# Patient Record
Sex: FEMALE | Race: WHITE | NOT HISPANIC OR LATINO | URBAN - METROPOLITAN AREA
[De-identification: names, ages, dates, MRNs, and addresses within clinical notes are randomized per-mention and may not be internally consistent; named-entity substitution may affect disease eponyms.]

---
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
## 2018-01-10 NOTE — PROGRESS NOTES
Assessment    1  Encounter for preventive health examination (V70 0) (Z00 00)   2  Bilateral impacted cerumen (380 4) (H61 23)    Plan  Bilateral impacted cerumen    · Removal Impacted Cerumen Requiring Instrumentation one or both ears - POC;  Status:Complete;   Done: 56RZP4830    Discussion/Summary  healthy adult female Currently, she eats a healthy diet and has an adequate exercise regimen  the risks and benefits of cervical cancer screening were discussed cervical cancer screening is not indicated Breast cancer screening: the risks and benefits of breast cancer screening were discussed, self breast exam technique was taught and monthly self breast exam was advised  Colorectal cancer screening: the risks and benefits of colorectal cancer screening were discussed and colorectal cancer screening is not indicated  Osteoporosis screening: the risks and benefits of osteoporosis screening were discussed and bone mineral density testing is not indicated  The risks and benefits of immunizations were discussed and immunizations are up to date  She was advised to be evaluated by an optometrist and a dentist  Advice and education were given regarding nutrition, aerobic exercise, weight bearing exercise, weight loss, calcium supplements, vitamin D supplements, reproductive health and self skin examination  Patient discussion: discussed with the patient  The patient was counseled regarding diagnostic results, instructions for management, risk factor reductions, prognosis, patient and family education, impressions, risks and benefits of treatment options, importance of compliance with treatment  Possible side effects of new medications were reviewed with the patient/guardian today  The treatment plan was reviewed with the patient/guardian   The patient/guardian understands and agrees with the treatment plan     Self Referrals: No      Chief Complaint  Patient is here for a physical exam for 's licence   Patient has paper with her      History of Present Illness  HM, Adult Female: The patient is being seen for a health maintenance evaluation  General Health: The patient's health since the last visit is described as good  She has regular dental visits  She denies vision problems  She denies hearing loss  Immunizations status: up to date  Lifestyle:  She consumes a diverse and healthy diet  She does not have any weight concerns  She exercises regularly  She does not use tobacco  She denies alcohol use  She denies drug use  Reproductive health: the patient is premenopausal   she reports normal menses  she uses contraception  For contraception, she uses oral contraception pills  she is sexually active  she denies prior pregnancies  Screening: cancer screening reviewed and current  Cervical cancer screening includes no previous pap smear  Breast cancer screening includes no previous mammogram  Colorectal cancer screening includes no previous colonoscopy  metabolic screening reviewed and current  Metabolic screening includes no previous lipid profile, no previous glucose screening, no previous thyroid function test and no previous DEXA  Cardiovascular risk factors: no hypertension and no diabetes  Safety elements used: seat belt, safe driving habits, sunscreen, smoke detector, carbon monoxide detector and hot water temperature less than 120 degrees F  Risk assessments performed include chemical abuse and depression symptoms  Risk findings: no passive smoke exposure, no unsafe sexual behavior, no chemical abuse, no domestic violence, no guns at home, no anxiety symptoms, no depression symptoms and no stress management concerns  HPI: here for a drivers permit and  - no new issues  Student at [address]  parents are in [address]    Constitutional: No fever, no chills, feels well, no tiredness, no recent weight gain or weight loss     Eyes: No complaints of eye pain, no red eyes, no eyesight problems, no discharge, no dry eyes, no itching of eyes  ENT: no complaints of earache, no loss of hearing, no nose bleeds, no nasal discharge, no sore throat, no hoarseness  Cardiovascular: No complaints of slow heart rate, no fast heart rate, no chest pain, no palpitations, no leg claudication, no lower extremity edema  Respiratory: No complaints of shortness of breath, no wheezing, no cough, no SOB on exertion, no orthopnea, no PND  Gastrointestinal: No complaints of abdominal pain, no constipation, no nausea or vomiting, no diarrhea, no bloody stools  Genitourinary: No complaints of dysuria, no incontinence, no pelvic pain, no dysmenorrhea, no vaginal discharge or bleeding  Musculoskeletal: No complaints of arthralgias, no myalgias, no joint swelling or stiffness, no limb pain or swelling  Integumentary: No complaints of skin rash or lesions, no itching, no skin wounds, no breast pain or lump  Neurological: No complaints of headache, no confusion, no convulsions, no numbness, no dizziness or fainting, no tingling, no limb weakness, no difficulty walking  Psychiatric: Not suicidal, no sleep disturbance, no anxiety or depression, no change in personality, no emotional problems  Endocrine: No complaints of proptosis, no hot flashes, no muscle weakness, no deepening of the voice, no feelings of weakness  Hematologic/Lymphatic: No complaints of swollen glands, no swollen glands in the neck, does not bleed easily, does not bruise easily  Active Problems    1  Family planning counseling (V25 09) (Z30 09)   2   Influenza vaccine needed (V04 81) (Z23)    Past Medical History    · History of Acne, unspecified acne type (706 1) (L70 9)   · History of Irritant contact dermatitis, unspecified trigger (692 9) (L24 9)   · History of Known health problems: none (V49 89) (Z78 9)    Surgical History    · History of Prior Surgical Procedure Not Done    Family History  Mother    · Family history of hypothyroidism (V18 19) (Z83 49)   · Family history of osteoporosis (V17 81) (Z82 62)  Grandparent    · Family history of cardiac disorder (V17 49) (Z82 49)   · Family history of malignant neoplasm (V16 9) (Z80 9)  Maternal Grandmother    · Family history of hypertension (V17 49) (Z82 49)    Social History    · Always uses seat belt   · Does not drink alcohol (V49 89) (Z78 9)   · Full time student   · [employer]   · Never a smoker   · No caffeine use   · [employer]   · Single    Current Meds   1  Benzoyl Peroxide 2 5 % External Gel; Therapy: (Recorded:05Wqy0721) to Recorded   2  Cetaphil RestoraDerm External Lotion; USE ON hands 2-3 TIMES A DAY; Therapy: 94Qcu1601 to (Evaluate:99Buz7633)  Requested for: 35Ghf5595; Last   Rx:75Vjj0488 Ordered   3  Lo Loestrin Fe 1 MG-10 MCG / 10 MCG Oral Tablet; TAKE 1 TABLET DAILY AS   DIRECTED; Therapy: 43QBO1904 to (Evaluate:43Zac9499)  Requested for: 49NRY3586; Last   Rx:41Dar7510 Ordered    Allergies    1  No Known Drug Allergies    Vitals   Recorded: 52VFK9959 10:33AM   Temperature 97 4 F   Heart Rate 77   Respiration 18   Systolic 213   Diastolic 62   Height 5 ft 7 32 in   Weight 135 lb    BMI Calculated 20 94   BSA Calculated 1 72   BMI Percentile 41 %   2-20 Stature Percentile 88 %   2-20 Weight Percentile 61 %   O2 Saturation 98   Pain Scale 0     Physical Exam    Constitutional   General appearance: No acute distress, well appearing and well nourished  Eyes   Conjunctiva and lids: No swelling, erythema or discharge  Pupils and irises: Equal, round, reactive to light  Ears, Nose, Mouth, and Throat   External inspection of ears and nose: Normal     Otoscopic examination: Tympanic membranes translucent with normal light reflex  Canals patent without erythema  The right external canal had a cerumen impaction  The left external canal had a cerumen impaction  Hearing: Normal     Nasal mucosa, septum, and turbinates: Normal without edema or erythema  Lips, teeth, and gums: Normal, good dentition  Oropharynx: Normal with no erythema, edema, exudate or lesions  Neck   Neck: Supple, symmetric, trachea midline, no masses  Thyroid: Normal, no thyromegaly  Pulmonary   Respiratory effort: No increased work of breathing or signs of respiratory distress  Percussion of chest: Normal     Palpation of chest: Normal     Auscultation of lungs: Clear to auscultation  Cardiovascular   Palpation of heart: Normal PMI, no thrills  Auscultation of heart: Normal rate and rhythm, normal S1 and S2, no murmurs  Carotid pulses: 2+ bilaterally  Abdominal aorta: Normal     Pedal pulses: 2+ bilaterally  Examination of extremities for edema and/or varicosities: Normal     Chest   Chest: Normal     Abdomen   Abdomen: Non-tender, no masses  Liver and spleen: No hepatomegaly or splenomegaly  Examination for hernias: No hernia appreciated  Lymphatic   Palpation of lymph nodes in neck: No lymphadenopathy  Palpation of lymph nodes in axillae: No lymphadenopathy  Musculoskeletal   Gait and station: Normal     Digits and nails: Normal without clubbing or cyanosis  Joints, bones, and muscles: Normal     Stability: Normal     Muscle strength/tone: Normal     Skin   Skin and subcutaneous tissue: Normal without rashes or lesions  Palpation of skin and subcutaneous tissue: Normal turgor  Neurologic   Cranial nerves: Cranial nerves II-XII intact  Psychiatric   Judgment and insight: Normal     Orientation to person, place, and time: Normal     Recent and remote memory: Intact  Mood and affect: Normal        Procedure    Procedure: cerumen removal    Indication: tympanic membrane(s) could not be visualized in both ears  Procedure Note: The procedure was performed by the Provider and lasted 5 minute(s)  The ear was cleaned by using a curette  The procedure was successful     Post-Procedure:   Patient Status: the patient tolerated the procedure well    Complications: there were no complications  Patient instructions: dry ear precautions and avoid using q-tips  Follow-up as needed  Attending Note  Collaborating Physician Note: Collaborating Physician: I agree with the Advanced Practitioner note        Signatures   Electronically signed by : Claudean Squire; Nov 17 2017 11:14AM EST                       (Author)    Electronically signed by : PITER Mcgowan ; Nov 20 2017 10:55AM EST                       (Author)

## 2018-01-13 VITALS
OXYGEN SATURATION: 98 % | RESPIRATION RATE: 18 BRPM | TEMPERATURE: 98.7 F | BODY MASS INDEX: 21.05 KG/M2 | HEART RATE: 77 BPM | SYSTOLIC BLOOD PRESSURE: 112 MMHG | DIASTOLIC BLOOD PRESSURE: 70 MMHG | HEIGHT: 67 IN | WEIGHT: 134.13 LBS

## 2018-01-13 VITALS
SYSTOLIC BLOOD PRESSURE: 130 MMHG | HEART RATE: 74 BPM | HEIGHT: 67 IN | OXYGEN SATURATION: 99 % | BODY MASS INDEX: 21.66 KG/M2 | WEIGHT: 138 LBS | DIASTOLIC BLOOD PRESSURE: 64 MMHG | TEMPERATURE: 97.6 F

## 2018-01-14 VITALS
WEIGHT: 135 LBS | DIASTOLIC BLOOD PRESSURE: 62 MMHG | OXYGEN SATURATION: 98 % | HEART RATE: 77 BPM | TEMPERATURE: 97.4 F | BODY MASS INDEX: 21.19 KG/M2 | HEIGHT: 67 IN | SYSTOLIC BLOOD PRESSURE: 100 MMHG | RESPIRATION RATE: 18 BRPM

## 2018-01-16 NOTE — RESULT NOTES
Verified Results  (1) URINALYSIS w URINE C/S REFLEX (will reflex a microscopy if leukocytes, occult blood, or nitrites are not within normal limits) 13Oct2017 03:17PM Ariadna Saucedo    Order Number: [id number]     Test Name Result Flag Reference   COLOR Yellow     CLARITY Cloudy     PH UA 6 0  4 5-8 0   LEUKOCYTE ESTERASE UA Negative  Negative   NITRITE UA Negative  Negative   PROTEIN UA 30 (1+) mg/dl A Negative   GLUCOSE UA Negative mg/dl  Negative   KETONES UA Negative mg/dl  Negative   UROBILINOGEN UA 0 2 E U /dl  0 2, 1 0 E U /dl   BILIRUBIN UA Negative  Negative   BLOOD UA Moderate A Negative   SPECIFIC GRAVITY UA 1 010  1 003-1 030   BACTERIA None Seen /hpf  None Seen, Occasional   EPITHELIAL CELLS None Seen /hpf  None Seen, Occasional   HYALINE CASTS None Seen /lpf  None Seen   RBC UA 4-10 /hpf A None Seen, 0-5   WBC UA 2-4 /hpf A None Seen, 0-5, 5-55, 5-65

## 2018-01-16 NOTE — PROGRESS NOTES
Chief Complaint  pt here for b/p check  pt states that oral contraceptives are working well, no issues      Active Problems   1  Family planning counseling (V25 09) (Z30 09)    Current Meds  1  Benzoyl Peroxide 2 5 % External Gel; Therapy: (Recorded:09Vgz2482) to Recorded  2  Calcium 600-400 MG-UNIT Oral Tablet Chewable; chew one tab daily; Therapy: 85KJM0330 to (Last Rx:09Feb2017) Ordered  3  Cetaphil RestoraDerm External Lotion; USE ON hands 2-3 TIMES A DAY; Therapy: 18Bev4042 to (Evaluate:86Hju7483)  Requested for: 94Irf0515; Last   Rx:47Kzc2831 Ordered  4  Clindamycin Phos-Benzoyl Perox 1 2-5 % External Gel; APPLY SPARINGLY TO   AFFECTED AREA(S) ONCE DAILY IN THE EVENING after resolution use as needed; Therapy: 38Fsz3982 to (Last Rx:19Xrx2609)  Requested for: 70Efl3783 Ordered  5  Folic Acid 319 MCG Oral Tablet; Take 1 tablet daily; Therapy: 22FZD3659 to (Last Rx:09Feb2017) Ordered  6  Lo Loestrin Fe 1 MG-10 MCG / 10 MCG Oral Tablet; TAKE 1 TABLET DAILY AS   DIRECTED; Therapy: 35ZXZ2029 to (Evaluate:16Nov2017)  Requested for: 49HZB5654; Last   Rx:09Feb2017 Ordered    Allergies   1   No Known Drug Allergies    Vitals  Signs    Heart Rate: 78  Systolic: 548  Diastolic: 72  O2 Saturation: 99    Plan  Family planning counseling    · Renew: Lo Loestrin Fe 1 MG-10 MCG / 10 MCG Oral Tablet; TAKE 1 TABLET DAILY AS  DIRECTED    Signatures   Electronically signed by : Tal Cortes; Oct 13 2017 10:35AM EST                       (Author)    Electronically signed by : PITER Ratliff ; Oct 13 2017 10:44AM EST                       (Author)

## 2018-01-22 VITALS — OXYGEN SATURATION: 99 % | DIASTOLIC BLOOD PRESSURE: 72 MMHG | SYSTOLIC BLOOD PRESSURE: 102 MMHG | HEART RATE: 78 BPM

## 2018-02-23 DIAGNOSIS — Z30.41 USES ORAL CONTRACEPTION: Primary | ICD-10-CM

## 2018-02-23 RX ORDER — NORETHINDRONE ACETATE AND ETHINYL ESTRADIOL, ETHINYL ESTRADIOL AND FERROUS FUMARATE 1MG-10(24)
KIT ORAL
Qty: 140 TABLET | Refills: 1 | Status: SHIPPED | OUTPATIENT
Start: 2018-02-23 | End: 2018-11-20 | Stop reason: SDUPTHER

## 2018-11-20 DIAGNOSIS — Z30.41 USES ORAL CONTRACEPTION: ICD-10-CM

## 2018-12-06 DIAGNOSIS — Z30.41 USES ORAL CONTRACEPTION: ICD-10-CM

## 2019-05-29 ENCOUNTER — TELEPHONE (OUTPATIENT)
Dept: FAMILY MEDICINE CLINIC | Facility: CLINIC | Age: 19
End: 2019-05-29

## 2019-05-29 DIAGNOSIS — Z30.41 USES ORAL CONTRACEPTION: ICD-10-CM

## 2019-06-17 ENCOUNTER — TELEPHONE (OUTPATIENT)
Dept: FAMILY MEDICINE CLINIC | Facility: CLINIC | Age: 19
End: 2019-06-17

## 2019-06-26 ENCOUNTER — TELEPHONE (OUTPATIENT)
Dept: OTHER | Facility: OTHER | Age: 19
End: 2019-06-26

## 2019-06-27 ENCOUNTER — TELEPHONE (OUTPATIENT)
Dept: FAMILY MEDICINE CLINIC | Facility: CLINIC | Age: 19
End: 2019-06-27

## 2019-06-27 DIAGNOSIS — Z30.41 USES ORAL CONTRACEPTION: Primary | ICD-10-CM

## 2019-06-27 RX ORDER — NORGESTIMATE AND ETHINYL ESTRADIOL 7DAYSX3 LO
1 KIT ORAL DAILY
Qty: 30 TABLET | Refills: 6 | Status: SHIPPED | OUTPATIENT
Start: 2019-06-27 | End: 2020-01-01 | Stop reason: SDUPTHER

## 2019-08-02 ENCOUNTER — OFFICE VISIT (OUTPATIENT)
Dept: URGENT CARE | Facility: URGENT CARE | Age: 19
End: 2019-08-02
Payer: COMMERCIAL

## 2019-08-02 VITALS
WEIGHT: 137 LBS | DIASTOLIC BLOOD PRESSURE: 79 MMHG | SYSTOLIC BLOOD PRESSURE: 117 MMHG | BODY MASS INDEX: 21.5 KG/M2 | HEART RATE: 84 BPM | OXYGEN SATURATION: 96 % | RESPIRATION RATE: 16 BRPM | HEIGHT: 67 IN | TEMPERATURE: 99.2 F

## 2019-08-02 DIAGNOSIS — J03.90 ACUTE TONSILLITIS, UNSPECIFIED ETIOLOGY: ICD-10-CM

## 2019-08-02 DIAGNOSIS — J02.9 SORE THROAT: Primary | ICD-10-CM

## 2019-08-02 LAB — GP B STREP AG SPEC QL: NEGATIVE

## 2019-08-02 PROCEDURE — 87070 CULTURE OTHR SPECIMN AEROBIC: CPT | Performed by: FAMILY MEDICINE

## 2019-08-02 PROCEDURE — 87880 STREP A ASSAY W/OPTIC: CPT | Mod: QW | Performed by: FAMILY MEDICINE

## 2019-08-02 PROCEDURE — 99203 OFFICE O/P NEW LOW 30 MIN: CPT | Performed by: FAMILY MEDICINE

## 2019-08-02 RX ORDER — IBUPROFEN 800 MG/1
800 TABLET ORAL EVERY 6 HOURS PRN
Qty: 30 TABLET | Refills: 0
Start: 2019-08-02 | End: 2019-08-06

## 2019-08-02 RX ORDER — NORETHINDRONE ACETATE AND ETHINYL ESTRADIOL, ETHINYL ESTRADIOL AND FERROUS FUMARATE 1MG-10(24)
1 KIT ORAL
Refills: 3 | COMMUNITY
Start: 2019-06-03

## 2019-08-02 ASSESSMENT — ENCOUNTER SYMPTOMS
STRIDOR: 0
VOMITING: 0
SWOLLEN GLANDS: 1
COUGH: 0
POLYPHAGIA: 0
DIARRHEA: 0
HEADACHES: 0
TROUBLE SWALLOWING: 0
ADENOPATHY: 0
NECK PAIN: 0
HOARSE VOICE: 0
CONFUSION: 0
FEVER: 0
POLYDIPSIA: 0
ABDOMINAL PAIN: 0
SORE THROAT: 1
SHORTNESS OF BREATH: 0

## 2019-08-02 ASSESSMENT — PAIN SCALES - GENERAL: PAINLEVEL: 7

## 2019-08-02 NOTE — PROGRESS NOTES
Subjective      Gloria Cooper is a 19 y.o. female who presents for sore throat with swollen glands.  Her pain is located mainly on the right side of the throat and the pain does migrate towards her left ear.      Sore Throat    This is a new problem. The current episode started in the past 7 days. The problem has been unchanged. The pain is worse on the left side. There has been no fever. The pain is at a severity of 5/10. The pain is moderate. Associated symptoms include ear pain and swollen glands. Pertinent negatives include no abdominal pain, congestion, coughing, diarrhea, drooling, ear discharge, headaches, hoarse voice, plugged ear sensation, neck pain, shortness of breath, stridor, trouble swallowing or vomiting. She has had no exposure to strep or mono. She has tried NSAIDs for the symptoms. The treatment provided mild relief.       The following have been reviewed and updated as appropriate in this visit:         No Known Allergies  Current Outpatient Medications   Medication Sig Dispense Refill   • LO LOESTRIN FE 1 mg-10 mcg (24)/10 mcg (2) tablet Take 1 tablet by mouth  3   • ibuprofen (ADVIL,MOTRIN) 800 mg tablet Take 1 tablet (800 mg total) by mouth every 6 (six) hours as needed for pain scale 1-3/10 for up to 4 days 30 tablet 0     No current facility-administered medications for this visit.      History reviewed. No pertinent past medical history.  History reviewed. No pertinent surgical history.  No family history on file.  Social History     Socioeconomic History   • Marital status: Single     Spouse name: Not on file   • Number of children: Not on file   • Years of education: Not on file   • Highest education level: Not on file   Occupational History   • Not on file   Social Needs   • Financial resource strain: Not on file   • Food insecurity:     Worry: Not on file     Inability: Not on file   • Transportation needs:     Medical: Not on file     Non-medical: Not on file   Tobacco Use   •  "Smoking status: Never Smoker   • Smokeless tobacco: Never Used   Substance and Sexual Activity   • Alcohol use: Not on file   • Drug use: Not on file   • Sexual activity: Not on file   Lifestyle   • Physical activity:     Days per week: Not on file     Minutes per session: Not on file   • Stress: Not on file   Relationships   • Social connections:     Talks on phone: Not on file     Gets together: Not on file     Attends L[employer]-day service: Not on file     Active member of club or organization: Not on file     Attends meetings of clubs or organizations: Not on file     Relationship status: Not on file   Other Topics Concern   • Not on file   Social History Narrative   • Not on file       Review of Systems   Constitutional: Negative for fever.   HENT: Positive for ear pain and sore throat. Negative for congestion, drooling, ear discharge, hoarse voice and trouble swallowing.    Respiratory: Negative for cough, shortness of breath and stridor.    Gastrointestinal: Negative for abdominal pain, diarrhea and vomiting.   Endocrine: Negative for polydipsia, polyphagia and polyuria.   Musculoskeletal: Negative for neck pain.   Skin: Negative for rash.   Allergic/Immunologic: Negative for immunocompromised state.   Neurological: Negative for headaches.   Hematological: Negative for adenopathy.   Psychiatric/Behavioral: Negative for confusion.       Objective   /79 (BP Location: Right arm, Patient Position: Sitting, Cuff Size: Reg)   Pulse 84   Temp 37.3 °C (99.2 °F) (Temporal)   Resp 16   Ht 1.702 m (5' 7\")   Wt 62.1 kg (137 lb)   SpO2 96%   BMI 21.46 kg/m²     Physical Exam   Constitutional: She is oriented to person, place, and time.   HENT:   Head: Normocephalic.   Right Ear: External ear normal.   Left Ear: External ear normal.   Nose: Nose normal.   Mouth/Throat: Oropharynx is clear and moist and mucous membranes are normal. Tonsils are 2+ on the right. Tonsils are 2+ on the left. Tonsillar exudate.   Eyes: " Pupils are equal, round, and reactive to light. Conjunctivae and EOM are normal.   Neck: Normal range of motion. Neck supple.   Cardiovascular: Normal rate, regular rhythm and normal heart sounds.   No murmur heard.  Pulmonary/Chest: Effort normal and breath sounds normal.   Abdominal: Soft.   Musculoskeletal: Normal range of motion.   Lymphadenopathy:     She has cervical adenopathy.   Neurological: She is alert and oriented to person, place, and time.   Skin: Skin is warm and dry. Capillary refill takes less than 2 seconds. No rash noted.   Psychiatric: She has a normal mood and affect. Her behavior is normal. Judgment and thought content normal.   Nursing note and vitals reviewed.      Assessment/Plan   Diagnoses and all orders for this visit:    Sore throat  -     Rapid Strep Screen Swab  -     Throat culture  -     ibuprofen (ADVIL,MOTRIN) 800 mg tablet; Take 1 tablet (800 mg total) by mouth every 6 (six) hours as needed for pain scale 1-3/10 for up to 4 days    Acute tonsillitis, unspecified etiology    Rapid strep test is negative.  She will treat her pain with accommodation ibuprofen and Cepacol lozenges.  Throat culture is ordered.  She will treat the antibiotic should her culture return positive.    Oneil Miller MD

## 2019-08-04 LAB — BACTERIA THROAT CULT: NORMAL

## 2020-01-01 DIAGNOSIS — Z30.41 USES ORAL CONTRACEPTION: ICD-10-CM

## 2020-01-02 RX ORDER — NORGESTIMATE AND ETHINYL ESTRADIOL 7DAYSX3 LO
1 KIT ORAL DAILY
Qty: 30 TABLET | Refills: 0 | Status: SHIPPED | OUTPATIENT
Start: 2020-01-02 | End: 2020-02-07 | Stop reason: SDUPTHER

## 2020-01-02 RX ORDER — EMOLLIENT COMBINATION NO.62
LOTION (ML) TOPICAL
COMMUNITY
Start: 2016-08-29

## 2020-01-02 RX ORDER — BENZOYL PEROXIDE 2.5 G/100G
GEL TOPICAL
COMMUNITY

## 2020-01-02 RX ORDER — NORGESTIMATE AND ETHINYL ESTRADIOL 7DAYSX3 LO
1 KIT ORAL
COMMUNITY
Start: 2019-06-27 | End: 2020-04-28

## 2020-01-02 NOTE — TELEPHONE ENCOUNTER
Has not been seen here for over 2 years and needs her first pap - either make appointment to see Dr Claybon Opitz or make appointment to see GYN  One month supply only

## 2020-02-07 DIAGNOSIS — Z30.41 USES ORAL CONTRACEPTION: ICD-10-CM

## 2020-02-07 RX ORDER — NORGESTIMATE AND ETHINYL ESTRADIOL 7DAYSX3 LO
1 KIT ORAL DAILY
Qty: 30 TABLET | Refills: 2 | Status: SHIPPED | OUTPATIENT
Start: 2020-02-07 | End: 2020-04-28

## 2020-04-23 DIAGNOSIS — Z30.41 USES ORAL CONTRACEPTION: ICD-10-CM

## 2020-04-23 RX ORDER — NORGESTIMATE AND ETHINYL ESTRADIOL
KIT
Qty: 28 TABLET | Refills: 2 | OUTPATIENT
Start: 2020-04-23

## 2020-04-28 ENCOUNTER — TELEMEDICINE (OUTPATIENT)
Dept: FAMILY MEDICINE CLINIC | Facility: CLINIC | Age: 20
End: 2020-04-28
Payer: COMMERCIAL

## 2020-04-28 DIAGNOSIS — Z30.41 USES ORAL CONTRACEPTION: Primary | ICD-10-CM

## 2020-04-28 PROCEDURE — 99212 OFFICE O/P EST SF 10 MIN: CPT | Performed by: FAMILY MEDICINE

## 2020-04-28 RX ORDER — NORGESTIMATE AND ETHINYL ESTRADIOL 7DAYSX3 LO
1 KIT ORAL DAILY
Qty: 90 TABLET | Refills: 1 | Status: SHIPPED | OUTPATIENT
Start: 2020-04-28 | End: 2020-10-05 | Stop reason: SDUPTHER

## 2020-04-28 RX ORDER — CEFUROXIME AXETIL 500 MG/1
TABLET ORAL
COMMUNITY
Start: 2020-04-09 | End: 2020-04-28 | Stop reason: ALTCHOICE

## 2020-04-28 RX ORDER — NORGESTIMATE AND ETHINYL ESTRADIOL 7DAYSX3 LO
1 KIT ORAL DAILY
Qty: 28 TABLET | Refills: 5 | Status: SHIPPED | OUTPATIENT
Start: 2020-04-28 | End: 2020-04-28 | Stop reason: SDUPTHER

## 2020-10-05 DIAGNOSIS — Z30.41 USES ORAL CONTRACEPTION: ICD-10-CM

## 2020-10-06 RX ORDER — NORGESTIMATE AND ETHINYL ESTRADIOL 7DAYSX3 LO
1 KIT ORAL DAILY
Qty: 90 TABLET | Refills: 1 | Status: SHIPPED | OUTPATIENT
Start: 2020-10-06 | End: 2021-03-25

## 2021-03-25 DIAGNOSIS — Z30.41 USES ORAL CONTRACEPTION: ICD-10-CM

## 2021-03-25 RX ORDER — NORGESTIMATE AND ETHINYL ESTRADIOL 7DAYSX3 LO
KIT ORAL
Qty: 84 TABLET | Refills: 0 | Status: SHIPPED | OUTPATIENT
Start: 2021-03-25 | End: 2021-06-28

## 2021-06-27 DIAGNOSIS — Z30.41 USES ORAL CONTRACEPTION: ICD-10-CM

## 2021-06-28 RX ORDER — NORGESTIMATE AND ETHINYL ESTRADIOL 7DAYSX3 LO
KIT ORAL
Qty: 84 TABLET | Refills: 0 | Status: SHIPPED | OUTPATIENT
Start: 2021-06-28